# Patient Record
Sex: FEMALE | URBAN - METROPOLITAN AREA
[De-identification: names, ages, dates, MRNs, and addresses within clinical notes are randomized per-mention and may not be internally consistent; named-entity substitution may affect disease eponyms.]

---

## 2022-04-30 ENCOUNTER — TELEPHONE ENCOUNTER (OUTPATIENT)
Dept: URBAN - METROPOLITAN AREA CLINIC 121 | Facility: CLINIC | Age: 67
End: 2022-04-30

## 2022-05-01 ENCOUNTER — TELEPHONE ENCOUNTER (OUTPATIENT)
Dept: URBAN - METROPOLITAN AREA CLINIC 121 | Facility: CLINIC | Age: 67
End: 2022-05-01

## 2022-12-28 ENCOUNTER — CLAIMS CREATED FROM THE CLAIM WINDOW (OUTPATIENT)
Dept: URBAN - METROPOLITAN AREA CLINIC 86 | Facility: CLINIC | Age: 67
End: 2022-12-28
Payer: MEDICARE

## 2022-12-28 ENCOUNTER — WEB ENCOUNTER (OUTPATIENT)
Dept: URBAN - METROPOLITAN AREA CLINIC 86 | Facility: CLINIC | Age: 67
End: 2022-12-28

## 2022-12-28 VITALS
DIASTOLIC BLOOD PRESSURE: 81 MMHG | HEART RATE: 115 BPM | SYSTOLIC BLOOD PRESSURE: 127 MMHG | TEMPERATURE: 97.8 F | BODY MASS INDEX: 29.66 KG/M2 | WEIGHT: 189 LBS | HEIGHT: 67 IN

## 2022-12-28 DIAGNOSIS — N18.31 STAGE 3A CHRONIC KIDNEY DISEASE: ICD-10-CM

## 2022-12-28 DIAGNOSIS — Z71.89 VACCINE COUNSELING: ICD-10-CM

## 2022-12-28 DIAGNOSIS — E78.5 HYPERLIPIDEMIA, UNSPECIFIED HYPERLIPIDEMIA TYPE: ICD-10-CM

## 2022-12-28 DIAGNOSIS — I10 HYPERTENSION, UNSPECIFIED TYPE: ICD-10-CM

## 2022-12-28 DIAGNOSIS — B19.20 HEPATITIS C VIRUS INFECTION WITHOUT HEPATIC COMA, UNSPECIFIED CHRONICITY: ICD-10-CM

## 2022-12-28 PROCEDURE — 99204 OFFICE O/P NEW MOD 45 MIN: CPT | Performed by: PHYSICIAN ASSISTANT

## 2022-12-28 PROCEDURE — 99204 OFFICE O/P NEW MOD 45 MIN: CPT

## 2022-12-28 RX ORDER — AMLODIPINE BESYLATE 10 MG/1
1 TABLET TABLET ORAL ONCE A DAY
Status: ACTIVE | COMMUNITY

## 2022-12-28 RX ORDER — COLESEVELAM HYDROCHLORIDE 3.75 G/1
1 PACKET MIXED WITH WATER WITH A MEAL FOR SUSPENSION ORAL ONCE A DAY
Status: ACTIVE | COMMUNITY

## 2022-12-28 NOTE — HPI-TODAY'S VISIT:
This is a 67 year old femal  referred by  for evaluation of hepatitis C. A copy of this note will be sent to the referring provider.   Recent labs done and was told she had hcv .  I do not have the results but the lab that was ordered did reflex to the rna. Will have the office check on this.   Recent imaging: none  Patient prior treatment for hep C: no  Denies hx drug use, tranfusions, needlesticks, no tatoos .Of note eleazar was in prision at one time  PMH includes HTN, CKD, HLD.  Medications reviewed  Discussed need genotype and us and need to get the viral load and a US before started and I discussed epclusa and will provide information

## 2022-12-29 PROBLEM — 50711007: Status: ACTIVE | Noted: 2022-12-27

## 2022-12-30 LAB
A/G RATIO: 1.3
ABSOLUTE BASOPHILS: 29
ABSOLUTE EOSINOPHILS: 72
ABSOLUTE LYMPHOCYTES: 2059
ABSOLUTE MONOCYTES: 353
ABSOLUTE NEUTROPHILS: 4687
ALBUMIN: 4.3
ALKALINE PHOSPHATASE: 114
ALT (SGPT): 20
AST (SGOT): 17
BASOPHILS: 0.4
BILIRUBIN, TOTAL: 0.5
BUN/CREATININE RATIO: 16
BUN: 25
CALCIUM: 9.7
CARBON DIOXIDE, TOTAL: 23
CHLORIDE: 107
CREATININE: 1.52
EGFR: 37
EOSINOPHILS: 1
GLOBULIN, TOTAL: 3.3
GLUCOSE: 148
HCV RNA, QUANTITATIVE REAL TIME PCR: <1.18
HCV RNA, QUANTITATIVE REAL TIME PCR: <15
HEMATOCRIT: 38.9
HEMOGLOBIN: 12.6
HEPATITIS A AB, TOTAL: REACTIVE
HEPATITIS B CORE AB TOTAL: (no result)
HEPATITIS B SURFACE AB IMMUNITY, QN: 307
HEPATITIS B SURFACE ANTIGEN: (no result)
LYMPHOCYTES: 28.6
MCH: 28.4
MCHC: 32.4
MCV: 87.6
MONOCYTES: 4.9
MPV: 11.5
NEUTROPHILS: 65.1
PLATELET COUNT: 223
POTASSIUM: 4.2
PROTEIN, TOTAL: 7.6
RDW: 13.6
RED BLOOD CELL COUNT: 4.44
SODIUM: 141
WHITE BLOOD CELL COUNT: 7.2

## 2023-01-03 ENCOUNTER — CLAIMS CREATED FROM THE CLAIM WINDOW (OUTPATIENT)
Dept: URBAN - METROPOLITAN AREA CLINIC 91 | Facility: CLINIC | Age: 68
End: 2023-01-03
Payer: MEDICARE

## 2023-01-03 DIAGNOSIS — N28.1 CYST OF RIGHT KIDNEY: ICD-10-CM

## 2023-01-03 DIAGNOSIS — K80.20 CHOLELITHIASIS: ICD-10-CM

## 2023-01-03 DIAGNOSIS — K76.0 FATTY LIVER: ICD-10-CM

## 2023-01-03 PROCEDURE — 93975 VASCULAR STUDY: CPT

## 2023-01-03 PROCEDURE — 76705 ECHO EXAM OF ABDOMEN: CPT

## 2023-01-03 PROCEDURE — INT INTEREST PAYMENT

## 2023-01-03 RX ORDER — AMLODIPINE BESYLATE 10 MG/1
1 TABLET TABLET ORAL ONCE A DAY
Status: ACTIVE | COMMUNITY

## 2023-01-03 RX ORDER — COLESEVELAM HYDROCHLORIDE 3.75 G/1
1 PACKET MIXED WITH WATER WITH A MEAL FOR SUSPENSION ORAL ONCE A DAY
Status: ACTIVE | COMMUNITY

## 2023-01-04 PROBLEM — 266474003 CHOLELITHIASIS: Status: ACTIVE | Noted: 2023-01-04

## 2023-01-05 ENCOUNTER — TELEPHONE ENCOUNTER (OUTPATIENT)
Dept: URBAN - METROPOLITAN AREA CLINIC 86 | Facility: CLINIC | Age: 68
End: 2023-01-05

## 2023-01-05 NOTE — HPI-TODAY'S VISIT:
Dear Sarah Cintron,  1/3/23 Ultrasound is back  Liver coarsened in echotexture no suspicious lesions. 1.2 cm gallstone, otherwise gallbladder normal. The cbd is 3.3 mm. Right kidney 9.6 cm in length with mild cortical thinning and top/normal/borderline increased echogenicity. 1x1.1 cm simple cyst in right kidney. Please share this with your primary care. spleen 7.1 cm and homogenous in echogenicity. no lesions. hepatic vasculature patent. Overall they thought there was fatty liver, gallstone, and a simple cyst. Please be sure to share with your primary care.  We will review this at your follow up.  Kim Shay PA-C

## 2023-01-06 ENCOUNTER — TELEPHONE ENCOUNTER (OUTPATIENT)
Dept: URBAN - METROPOLITAN AREA CLINIC 92 | Facility: CLINIC | Age: 68
End: 2023-01-06

## 2023-01-10 ENCOUNTER — TELEPHONE ENCOUNTER (OUTPATIENT)
Dept: URBAN - METROPOLITAN AREA CLINIC 86 | Facility: CLINIC | Age: 68
End: 2023-01-10

## 2023-01-10 ENCOUNTER — OFFICE VISIT (OUTPATIENT)
Dept: URBAN - METROPOLITAN AREA TELEHEALTH 2 | Facility: TELEHEALTH | Age: 68
End: 2023-01-10
Payer: MEDICARE

## 2023-01-10 DIAGNOSIS — E78.5 HYPERLIPIDEMIA, UNSPECIFIED HYPERLIPIDEMIA TYPE: ICD-10-CM

## 2023-01-10 DIAGNOSIS — N18.31 STAGE 3A CHRONIC KIDNEY DISEASE: ICD-10-CM

## 2023-01-10 DIAGNOSIS — I10 HYPERTENSION, UNSPECIFIED TYPE: ICD-10-CM

## 2023-01-10 DIAGNOSIS — K76.0 FATTY LIVER: ICD-10-CM

## 2023-01-10 PROBLEM — 409063005 COUNSELING: Status: ACTIVE | Noted: 2022-12-27

## 2023-01-10 PROCEDURE — 99214 OFFICE O/P EST MOD 30 MIN: CPT

## 2023-01-10 RX ORDER — AMLODIPINE BESYLATE 10 MG/1
1 TABLET TABLET ORAL ONCE A DAY
Status: ACTIVE | COMMUNITY

## 2023-01-10 RX ORDER — COLESEVELAM HYDROCHLORIDE 3.75 G/1
1 PACKET MIXED WITH WATER WITH A MEAL FOR SUSPENSION ORAL ONCE A DAY
Status: ACTIVE | COMMUNITY

## 2023-01-10 NOTE — HPI-TODAY'S VISIT:
This is a 67 year old femal  referred by  for evaluation of hepatitis C. A copy of this note will be sent to the referring provider.   1/10/2023 telemed 1/3/23 Ultrasound  Liver coarsened in echotexture no suspicious lesions. 1.2 cm gallstone, otherwise gallbladder normal. The cbd is 3.3 mm. Right kidney 9.6 cm in length with mild cortical thinning and top/normal/borderline increased echogenicity. 1x1.1 cm simple cyst in right kidney. Please share this with your primary care. spleen 7.1 cm and homogenous in echogenicity. no lesions. hepatic vasculature patent. Overall they thought there was fatty liver, gallstone, and a simple cyst. Please be sure to share with your primary care. Discussed the labs and no hcv seen and can check again to be sure, questioned again if ever treated and denies. denies being told this before. we will check this again  immune to hep a and b Discussed the fatty liver and needs to work on cholesterol, dm , exercise, weight loss    recap Recent labs done and was told she had hcv .  I do not have the results but the lab that was ordered did reflex to the rna. Will have the office check on this.   Recent imaging: none  Patient prior treatment for hep C: no  Denies hx drug use, tranfusions, needlesticks, no tatoos .Of note eleazar was in prision at one time  PMH includes HTN, CKD, HLD.  Medications reviewed  Discussed need genotype and us and need to get the viral load and a US before started and I discussed epclusa and will provide information

## 2023-01-20 ENCOUNTER — TELEPHONE ENCOUNTER (OUTPATIENT)
Dept: URBAN - METROPOLITAN AREA CLINIC 86 | Facility: CLINIC | Age: 68
End: 2023-01-20

## 2023-01-20 LAB
A/G RATIO: 1.4
ABSOLUTE BASOPHILS: 30
ABSOLUTE EOSINOPHILS: 98
ABSOLUTE LYMPHOCYTES: 2805
ABSOLUTE MONOCYTES: 443
ABSOLUTE NEUTROPHILS: 4125
ALBUMIN: 4.4
ALKALINE PHOSPHATASE: 89
ALT (SGPT): 5
AST (SGOT): 18
BASOPHILS: 0.4
BILIRUBIN, TOTAL: 0.5
BUN/CREATININE RATIO: 16
BUN: 19
CALCIUM: 9.8
CARBON DIOXIDE, TOTAL: 20
CHLORIDE: 107
CREATININE: 1.17
EGFR: 51
EOSINOPHILS: 1.3
GLOBULIN, TOTAL: 3.2
GLUCOSE: 80
HCV RNA, QUANTITATIVE REAL TIME PCR: <1.18
HCV RNA, QUANTITATIVE REAL TIME PCR: <15
HEMATOCRIT: 39.5
HEMOGLOBIN: 13.1
HEPATITIS C ANTIBODY: REACTIVE
LYMPHOCYTES: 37.4
MCH: 28.6
MCHC: 33.2
MCV: 86.2
MONOCYTES: 5.9
MPV: 11.1
NEUTROPHILS: 55
PLATELET COUNT: 205
POTASSIUM: 3.8
PROTEIN, TOTAL: 7.6
RDW: 13.6
RED BLOOD CELL COUNT: 4.58
SIGNAL TO CUT-OFF: 10.38
SODIUM: 139
WHITE BLOOD CELL COUNT: 7.5

## 2023-01-24 PROBLEM — 314706002: Status: ACTIVE | Noted: 2023-01-24

## 2023-02-01 ENCOUNTER — TELEPHONE ENCOUNTER (OUTPATIENT)
Dept: URBAN - METROPOLITAN AREA CLINIC 86 | Facility: CLINIC | Age: 68
End: 2023-02-01

## 2023-02-10 ENCOUNTER — OFFICE VISIT (OUTPATIENT)
Dept: URBAN - METROPOLITAN AREA TELEHEALTH 2 | Facility: TELEHEALTH | Age: 68
End: 2023-02-10

## 2023-02-10 VITALS — HEIGHT: 67 IN | BODY MASS INDEX: 29.03 KG/M2 | WEIGHT: 185 LBS

## 2023-02-10 PROBLEM — 700378005: Status: ACTIVE | Noted: 2022-12-28

## 2023-02-10 PROBLEM — 197321007 FATTY LIVER: Status: ACTIVE | Noted: 2023-01-04

## 2023-02-10 PROBLEM — 55822004: Status: ACTIVE | Noted: 2022-12-28

## 2023-02-10 PROBLEM — 70342003: Status: ACTIVE | Noted: 2023-01-10

## 2023-02-10 PROBLEM — 38341003: Status: ACTIVE | Noted: 2022-12-28

## 2023-02-10 RX ORDER — AMLODIPINE BESYLATE 10 MG/1
1 TABLET TABLET ORAL ONCE A DAY
Status: ACTIVE | COMMUNITY

## 2023-02-10 RX ORDER — COLESEVELAM HYDROCHLORIDE 3.75 G/1
1 PACKET MIXED WITH WATER WITH A MEAL FOR SUSPENSION ORAL ONCE A DAY
Status: ACTIVE | COMMUNITY

## 2023-08-21 ENCOUNTER — OFFICE VISIT (OUTPATIENT)
Dept: URBAN - METROPOLITAN AREA CLINIC 82 | Facility: CLINIC | Age: 68
End: 2023-08-21
Payer: MEDICARE

## 2023-08-21 VITALS
HEART RATE: 98 BPM | SYSTOLIC BLOOD PRESSURE: 122 MMHG | BODY MASS INDEX: 28.63 KG/M2 | TEMPERATURE: 97.7 F | WEIGHT: 182.4 LBS | HEIGHT: 67 IN | DIASTOLIC BLOOD PRESSURE: 81 MMHG

## 2023-08-21 DIAGNOSIS — R10.32 LLQ PAIN: ICD-10-CM

## 2023-08-21 PROBLEM — 301716002: Status: ACTIVE | Noted: 2023-08-21

## 2023-08-21 PROCEDURE — 99213 OFFICE O/P EST LOW 20 MIN: CPT | Performed by: STUDENT IN AN ORGANIZED HEALTH CARE EDUCATION/TRAINING PROGRAM

## 2023-08-21 RX ORDER — AMLODIPINE BESYLATE 10 MG/1
1 TABLET TABLET ORAL ONCE A DAY
Status: ACTIVE | COMMUNITY

## 2023-08-21 RX ORDER — COLESEVELAM HYDROCHLORIDE 3.75 G/1
1 PACKET MIXED WITH WATER WITH A MEAL FOR SUSPENSION ORAL ONCE A DAY
Status: ACTIVE | COMMUNITY

## 2023-08-21 NOTE — PHYSICAL EXAM GASTROINTESTINAL
Abdomen soft, LLQ >RLQ TTP, nondistended , + guarding on LLQ, no masses palpable , unremarkable bowel sounds

## 2023-08-21 NOTE — HPI-TODAY'S VISIT:
68 y.o female presents today for abd pain x 1 day, reports pain in LLQ, describes as sharp/soreness, 10/10. No N/V/F. Worse w/ movements. Heating pad provides minimal relief. Unsure if she has ate anything last night that can triggered this. Partner by side, fine, no sx. BM: daily, normal, no blood. US completed on 01/2023 notable for fatty liver, cholelithiasis, 1.1c, right renal cyst.

## 2023-08-25 ENCOUNTER — TELEPHONE ENCOUNTER (OUTPATIENT)
Dept: URBAN - METROPOLITAN AREA CLINIC 6 | Facility: CLINIC | Age: 68
End: 2023-08-25

## 2023-09-11 ENCOUNTER — OFFICE VISIT (OUTPATIENT)
Dept: URBAN - METROPOLITAN AREA CLINIC 84 | Facility: CLINIC | Age: 68
End: 2023-09-11

## 2023-09-11 RX ORDER — COLESEVELAM HYDROCHLORIDE 3.75 G/1
1 PACKET MIXED WITH WATER WITH A MEAL FOR SUSPENSION ORAL ONCE A DAY
Status: ACTIVE | COMMUNITY

## 2023-09-11 RX ORDER — AMLODIPINE BESYLATE 10 MG/1
1 TABLET TABLET ORAL ONCE A DAY
Status: ACTIVE | COMMUNITY

## 2023-10-27 ENCOUNTER — OFFICE VISIT (OUTPATIENT)
Dept: URBAN - METROPOLITAN AREA CLINIC 82 | Facility: CLINIC | Age: 68
End: 2023-10-27

## 2023-10-27 RX ORDER — COLESEVELAM HYDROCHLORIDE 3.75 G/1
1 PACKET MIXED WITH WATER WITH A MEAL FOR SUSPENSION ORAL ONCE A DAY
Status: ACTIVE | COMMUNITY

## 2023-10-27 RX ORDER — AMLODIPINE BESYLATE 10 MG/1
1 TABLET TABLET ORAL ONCE A DAY
Status: ACTIVE | COMMUNITY

## 2023-12-11 ENCOUNTER — OFFICE VISIT (OUTPATIENT)
Dept: URBAN - METROPOLITAN AREA CLINIC 82 | Facility: CLINIC | Age: 68
End: 2023-12-11

## 2023-12-28 ENCOUNTER — LAB OUTSIDE AN ENCOUNTER (OUTPATIENT)
Dept: URBAN - METROPOLITAN AREA CLINIC 82 | Facility: CLINIC | Age: 68
End: 2023-12-28

## 2023-12-28 ENCOUNTER — OFFICE VISIT (OUTPATIENT)
Dept: URBAN - METROPOLITAN AREA CLINIC 82 | Facility: CLINIC | Age: 68
End: 2023-12-28
Payer: MEDICARE

## 2023-12-28 VITALS
HEIGHT: 67 IN | BODY MASS INDEX: 29.57 KG/M2 | DIASTOLIC BLOOD PRESSURE: 72 MMHG | WEIGHT: 188.4 LBS | HEART RATE: 92 BPM | SYSTOLIC BLOOD PRESSURE: 106 MMHG | TEMPERATURE: 97.9 F

## 2023-12-28 DIAGNOSIS — Z87.19 HISTORY OF DIVERTICULITIS: ICD-10-CM

## 2023-12-28 PROCEDURE — 99214 OFFICE O/P EST MOD 30 MIN: CPT | Performed by: STUDENT IN AN ORGANIZED HEALTH CARE EDUCATION/TRAINING PROGRAM

## 2023-12-28 RX ORDER — AMLODIPINE BESYLATE 10 MG/1
1 TABLET TABLET ORAL ONCE A DAY
Status: ACTIVE | COMMUNITY

## 2023-12-28 RX ORDER — CHOLESTYRAMINE 4 G/9G
1 PACKET MIXED WITH WATER OR NON-CARBONATED DRINK POWDER, FOR SUSPENSION ORAL ONCE A DAY
Status: ACTIVE | COMMUNITY

## 2023-12-28 RX ORDER — COLESEVELAM HYDROCHLORIDE 3.75 G/1
1 PACKET MIXED WITH WATER WITH A MEAL FOR SUSPENSION ORAL ONCE A DAY
Status: DISCONTINUED | COMMUNITY

## 2023-12-28 NOTE — HPI-TODAY'S VISIT:
68 y.o female patient presents here for F/U after being dx w diverticulitis back in Aug. Patient was having LLQ pain around that time, was sent to ER where CT A/P notable for perforated diverticulitis w small abcess. General surgery was consulted, however, no surgical intervention was needed at the time. Was sent home w oral abx 14 days.  At this visit, patient admits doing well. No abd pain, n/v. BM daily, soft. No previous colonoscopy. No Fhx of CRC.

## 2024-01-17 ENCOUNTER — OFFICE VISIT (OUTPATIENT)
Dept: URBAN - METROPOLITAN AREA SURGERY CENTER 13 | Facility: SURGERY CENTER | Age: 69
End: 2024-01-17

## 2024-02-16 ENCOUNTER — OV EP (OUTPATIENT)
Dept: URBAN - METROPOLITAN AREA CLINIC 82 | Facility: CLINIC | Age: 69
End: 2024-02-16

## 2024-02-16 RX ORDER — CHOLESTYRAMINE 4 G/9G
1 PACKET MIXED WITH WATER OR NON-CARBONATED DRINK POWDER, FOR SUSPENSION ORAL ONCE A DAY
Status: ACTIVE | COMMUNITY

## 2024-02-16 RX ORDER — AMLODIPINE BESYLATE 10 MG/1
1 TABLET TABLET ORAL ONCE A DAY
Status: ACTIVE | COMMUNITY